# Patient Record
Sex: MALE | Race: WHITE | NOT HISPANIC OR LATINO | Employment: OTHER | URBAN - METROPOLITAN AREA
[De-identification: names, ages, dates, MRNs, and addresses within clinical notes are randomized per-mention and may not be internally consistent; named-entity substitution may affect disease eponyms.]

---

## 2018-03-19 PROBLEM — I20.9 ANGINA, CLASS IV: Status: ACTIVE | Noted: 2018-03-19

## 2018-08-06 ENCOUNTER — HOSPITAL ENCOUNTER (EMERGENCY)
Facility: HOSPITAL | Age: 63
Discharge: HOME | End: 2018-08-06
Payer: MEDICARE

## 2018-08-06 VITALS
WEIGHT: 315 LBS | BODY MASS INDEX: 46.65 KG/M2 | DIASTOLIC BLOOD PRESSURE: 75 MMHG | HEIGHT: 69 IN | OXYGEN SATURATION: 98 % | HEART RATE: 71 BPM | SYSTOLIC BLOOD PRESSURE: 161 MMHG | TEMPERATURE: 99 F | RESPIRATION RATE: 16 BRPM

## 2018-08-06 DIAGNOSIS — Z04.1 EXAM FOLLOWING MVC (MOTOR VEHICLE COLLISION), NO APPARENT INJURY: Primary | ICD-10-CM

## 2018-08-06 PROCEDURE — 25000003 PHARM REV CODE 250: Performed by: INTERNAL MEDICINE

## 2018-08-06 PROCEDURE — 99283 EMERGENCY DEPT VISIT LOW MDM: CPT

## 2018-08-06 RX ORDER — ACETAMINOPHEN 500 MG
1000 TABLET ORAL
Status: COMPLETED | OUTPATIENT
Start: 2018-08-06 | End: 2018-08-06

## 2018-08-06 RX ADMIN — ACETAMINOPHEN 1000 MG: 500 TABLET ORAL at 07:08
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
## 2019-09-16 ENCOUNTER — HOSPITAL ENCOUNTER (OUTPATIENT)
Dept: SLEEP MEDICINE | Facility: HOSPITAL | Age: 64
Discharge: HOME | End: 2019-09-16
Attending: NURSE PRACTITIONER
Payer: MEDICARE

## 2019-09-16 DIAGNOSIS — E66.2 PICKWICKIAN SYNDROME: ICD-10-CM

## 2019-09-16 PROCEDURE — 95810 POLYSOM 6/> YRS 4/> PARAM: CPT

## 2019-10-05 ENCOUNTER — HOSPITAL ENCOUNTER (OUTPATIENT)
Dept: SLEEP MEDICINE | Facility: HOSPITAL | Age: 64
Discharge: HOME | End: 2019-10-05
Attending: NURSE PRACTITIONER
Payer: MEDICARE

## 2019-10-05 DIAGNOSIS — G47.00 INSOMNIA WITH SLEEP APNEA: ICD-10-CM

## 2019-10-05 DIAGNOSIS — G47.30 INSOMNIA WITH SLEEP APNEA: ICD-10-CM

## 2019-10-05 DIAGNOSIS — E66.2 PICKWICKIAN SYNDROME: ICD-10-CM

## 2019-10-05 PROCEDURE — 95811 POLYSOM 6/>YRS CPAP 4/> PARM: CPT

## 2019-12-21 PROBLEM — I95.9 HYPOTENSIVE EPISODE: Status: ACTIVE | Noted: 2019-12-21

## 2019-12-21 PROBLEM — I48.0 PAF (PAROXYSMAL ATRIAL FIBRILLATION): Status: ACTIVE | Noted: 2019-12-21

## 2019-12-21 PROBLEM — G45.9 TIA (TRANSIENT ISCHEMIC ATTACK): Status: ACTIVE | Noted: 2019-12-21

## 2019-12-22 PROBLEM — I95.9 HYPOTENSIVE EPISODE: Status: RESOLVED | Noted: 2019-12-21 | Resolved: 2019-12-22

## 2020-01-15 PROBLEM — L02.91 ABSCESS: Status: ACTIVE | Noted: 2020-01-15

## 2020-01-15 PROBLEM — R79.89 ELEVATED TROPONIN: Status: ACTIVE | Noted: 2020-01-15

## 2020-01-15 PROBLEM — I49.5 SSS (SICK SINUS SYNDROME): Status: ACTIVE | Noted: 2020-01-15

## 2020-01-16 PROBLEM — I49.5 TACHY-BRADY SYNDROME: Status: ACTIVE | Noted: 2020-01-16

## 2020-05-18 PROBLEM — I49.3 PVC (PREMATURE VENTRICULAR CONTRACTION): Status: ACTIVE | Noted: 2020-05-18

## 2021-01-13 PROBLEM — I48.21 PERMANENT ATRIAL FIBRILLATION WITH RVR: Status: ACTIVE | Noted: 2021-01-13

## 2021-05-04 ENCOUNTER — PATIENT MESSAGE (OUTPATIENT)
Dept: RESEARCH | Facility: HOSPITAL | Age: 66
End: 2021-05-04

## 2022-01-01 ENCOUNTER — HOSPITAL ENCOUNTER (EMERGENCY)
Facility: HOSPITAL | Age: 67
Discharge: HOME | End: 2022-01-01
Attending: SURGERY
Payer: MEDICARE

## 2022-01-01 VITALS
HEART RATE: 72 BPM | SYSTOLIC BLOOD PRESSURE: 136 MMHG | DIASTOLIC BLOOD PRESSURE: 76 MMHG | TEMPERATURE: 98 F | RESPIRATION RATE: 20 BRPM | WEIGHT: 315 LBS | BODY MASS INDEX: 61.28 KG/M2 | OXYGEN SATURATION: 99 %

## 2022-01-01 DIAGNOSIS — J40 BRONCHITIS: ICD-10-CM

## 2022-01-01 DIAGNOSIS — Z20.822 ENCOUNTER FOR LABORATORY TESTING FOR COVID-19 VIRUS: Primary | ICD-10-CM

## 2022-01-01 LAB
INFLUENZA A, MOLECULAR: NEGATIVE
INFLUENZA B, MOLECULAR: NEGATIVE
SARS-COV-2 RDRP RESP QL NAA+PROBE: NEGATIVE
SPECIMEN SOURCE: NORMAL

## 2022-01-01 PROCEDURE — 99284 EMERGENCY DEPT VISIT MOD MDM: CPT | Mod: 25

## 2022-01-01 PROCEDURE — 87502 INFLUENZA DNA AMP PROBE: CPT | Performed by: SURGERY

## 2022-01-01 PROCEDURE — U0002 COVID-19 LAB TEST NON-CDC: HCPCS | Performed by: SURGERY

## 2022-01-01 RX ORDER — BENZONATATE 100 MG/1
200 CAPSULE ORAL 3 TIMES DAILY PRN
Qty: 20 CAPSULE | Refills: 0 | Status: SHIPPED | OUTPATIENT
Start: 2022-01-01 | End: 2022-01-11

## 2022-01-01 RX ORDER — DOXYCYCLINE 100 MG/1
100 CAPSULE ORAL 2 TIMES DAILY
Qty: 20 CAPSULE | Refills: 0 | Status: SHIPPED | OUTPATIENT
Start: 2022-01-01 | End: 2022-01-11

## 2022-01-01 NOTE — ED PROVIDER NOTES
[facility]                                                 I reviewed the ER triage nurse's note before evaluating the patient    Chief Complaint  66 y.o. male with COVID-19 Concerns (C/o runny nose, cough, and SOB today. )    History of Present Illness  Ruben Hathaway Sr. presents to the emergency room with nasal congestion today  Patient with clear nasal drainage nasal mucosa erythema on ER evaluation today  Patient with clear lung sounds with no wheezing or sputum identified in the ER today  Patient with no obvious signs of sputum production, no nausea vomiting or diarrhea  Patient is not toxic or dehydrated, 99% oxygenation, wants COVID test in the ER    The history is provided by the patient  Previous medical records were obtained from [facility]  Previous records are summarized from prior ER visits and hospitalizations  Medical HX: AFib, cellulitis, CHF, COPD, CAD, arrhythmia, HTN, SOB, VIRY  Surgical history significant for ablation, angiogram, cardiac stents, pacemaker, carpal tunnel   Allergic to sulfa  No significant family history  No significant social history, nonsmoker    I have reviewed all of this patient's past medical, surgical, family, and social   histories as well as active allergies and medications documented in the  electronic medical record    Review of Systems and Physical Exam      Review of Systems (all other ROS are otherwise negative)  -- Constitution - subjective fever, denies fatigue, no weakness, no chills  -- Eyes - no tearing or redness, no visual disturbance  -- Ear, Nose - sneezing, nasal congestion and clear discharge   -- Mouth,Throat - sore throat, no toothache, normal voice, normal swallowing  -- Respiratory - cough and congestion, no shortness of breath, no sputum  -- Cardiovascular - denies chest pain, no palpitations, denies claudication  -- Gastrointestinal - denies abdominal pain, nausea, vomiting, or diarrhea  -- Genitourinary - no dysuria, no hematuria,  no flank pain, no bladder pain  -- Musculoskeletal - denies back pain, negative for trauma or injury  -- Neurological - no headache, denies weakness or seizure; no LOC  -- Skin - denies pallor, rash, or changes in skin. no hives or welts noted     Physical Exam  -- Nursing note and vitals reviewed  -- Constitutional: Appears well-developed and well-nourished  -- Head: Atraumatic. Normocephalic. No obvious abnormality  -- Eyes: Pupils are equal and reactive to light. Normal conjunctiva and lids  -- Nose: nasal mucosa erythema and edema; clear nasal discharge noted   -- Throat: post-nasal drip with mild posterior oropharnyx erythema  -- Ears: External ears and TM normal bilaterally. Normal hearing and no drainage  -- Neck: Normal range of motion. Neck supple. No masses, trachea midline  -- Cardiac: Normal rate, regular rhythm and normal heart sounds  -- Respiratory: faint rhonchi at the bilateral bases with no active wheezing   -- Gastrointestinal: Soft, no tenderness. Normal bowel sounds. Normal liver edge  -- Musculoskeletal: Normal range of motion, no effusions. Joints stable   -- Neurological: No focal deficits. Showed good interaction with staff  -- Vascular: Posterior tibial, dorsalis pedis and radial pulses 2+ bilaterally       Emergency Room Course      Treatment and Evaluation  -- rapid Coronavirus PCR was negative  -- the patient tested negative for influenza  -- Chest x-ray showed no infiltrate and showed no acute pathology  -- IM 1 g Rocephin given today in the ER    Assessment, Disposition, & Plan      Diagnosis  [Z20.822] Encounter for laboratory testing for COVID-19 virus (Primary)  [J40] Bronchitis    Disposition and Plan  -- Disposition: home  -- Condition: stable  -- Follow-up: Patient to follow up with Asa Nobles MD in 1-2 days.  -- I advised the patient that we have found no life threatening condition today  -- At this time, I believe the patient is clinically stable for discharge.   -- Pt  understands that the visit today is to address immediate concerns   -- Further workup and evaluation as an outpatient may be required  -- The patient acknowledges that close follow up with a MD is required   -- Patient agrees to comply with all instruction and direction given in the ER    This note is dictated on M*Modal word recognition program.  There are word recognition mistakes that are occasionally missed on review.          Juan Mancuso MD  01/01/22 [id number]

## 2022-01-27 PROBLEM — S83.282A TEAR OF LATERAL MENISCUS OF LEFT KNEE: Status: ACTIVE | Noted: 2022-01-27

## 2022-01-27 PROBLEM — S83.207A TEAR OF LEFT MENISCUS AS CURRENT INJURY: Status: ACTIVE | Noted: 2022-01-27

## 2022-03-28 PROBLEM — K92.1 GASTROINTESTINAL HEMORRHAGE WITH MELENA: Status: ACTIVE | Noted: 2022-03-28

## 2022-03-29 PROBLEM — K25.0 ACUTE GASTRIC ULCER WITH BLEEDING: Status: ACTIVE | Noted: 2022-03-29

## 2022-03-30 PROBLEM — Z86.79 HISTORY OF HYPERTENSION: Status: ACTIVE | Noted: 2022-03-30

## 2022-03-30 PROBLEM — Z86.39 HISTORY OF HYPERLIPIDEMIA: Status: ACTIVE | Noted: 2022-03-30

## 2022-03-30 PROBLEM — D62 ACUTE BLOOD LOSS ANEMIA: Status: ACTIVE | Noted: 2022-03-30

## 2022-04-02 PROBLEM — K25.0 ACUTE GASTRIC ULCER WITH BLEEDING: Status: RESOLVED | Noted: 2022-03-29 | Resolved: 2022-04-02

## 2022-04-02 PROBLEM — K92.1 GASTROINTESTINAL HEMORRHAGE WITH MELENA: Status: RESOLVED | Noted: 2022-03-28 | Resolved: 2022-04-02

## 2022-04-02 PROBLEM — D62 ACUTE BLOOD LOSS ANEMIA: Status: RESOLVED | Noted: 2022-03-30 | Resolved: 2022-04-02

## 2023-09-21 PROBLEM — I50.32 CHRONIC DIASTOLIC HEART FAILURE, NYHA CLASS 2: Status: ACTIVE | Noted: 2023-09-21

## 2023-09-21 PROBLEM — R94.39 ABNORMAL MYOCARDIAL PERFUSION STUDY: Status: ACTIVE | Noted: 2023-09-21

## 2023-09-21 PROBLEM — I25.10 CAD (CORONARY ARTERY DISEASE): Status: ACTIVE | Noted: 2023-09-21

## 2025-05-23 ENCOUNTER — HOSPITAL ENCOUNTER (EMERGENCY)
Facility: HOSPITAL | Age: 70
Discharge: HOME | End: 2025-05-23
Payer: MEDICARE

## 2025-05-23 VITALS
OXYGEN SATURATION: 97 % | DIASTOLIC BLOOD PRESSURE: 85 MMHG | HEIGHT: 69 IN | RESPIRATION RATE: 20 BRPM | TEMPERATURE: 99 F | BODY MASS INDEX: 46.65 KG/M2 | HEART RATE: 73 BPM | WEIGHT: 315 LBS | SYSTOLIC BLOOD PRESSURE: 169 MMHG

## 2025-05-23 DIAGNOSIS — L30.9 DERMATITIS: Primary | ICD-10-CM

## 2025-05-23 PROCEDURE — 63600175 PHARM REV CODE 636 W HCPCS: Mod: JZ,TB

## 2025-05-23 PROCEDURE — 99284 EMERGENCY DEPT VISIT MOD MDM: CPT | Mod: 25

## 2025-05-23 PROCEDURE — 96372 THER/PROPH/DIAG INJ SC/IM: CPT

## 2025-05-23 RX ORDER — METHYLPREDNISOLONE SOD SUCC 125 MG
125 VIAL (EA) INJECTION
Status: COMPLETED | OUTPATIENT
Start: 2025-05-23 | End: 2025-05-23

## 2025-05-23 RX ORDER — CETIRIZINE HYDROCHLORIDE 10 MG/1
10 TABLET ORAL DAILY
Qty: 30 TABLET | Refills: 0 | Status: SHIPPED | OUTPATIENT
Start: 2025-05-23 | End: 2025-06-22

## 2025-05-23 RX ORDER — PREDNISONE 20 MG/1
40 TABLET ORAL DAILY
Qty: 10 TABLET | Refills: 0 | Status: SHIPPED | OUTPATIENT
Start: 2025-05-24 | End: 2025-05-29

## 2025-05-23 RX ORDER — DIPHENHYDRAMINE HYDROCHLORIDE 50 MG/ML
25 INJECTION, SOLUTION INTRAMUSCULAR; INTRAVENOUS
Status: DISCONTINUED | OUTPATIENT
Start: 2025-05-23 | End: 2025-05-23 | Stop reason: HOSPADM

## 2025-05-23 RX ADMIN — METHYLPREDNISOLONE SODIUM SUCCINATE 125 MG: 125 INJECTION, POWDER, FOR SOLUTION INTRAMUSCULAR; INTRAVENOUS at 04:05

## 2025-05-23 NOTE — ED PROVIDER NOTES
Encounter Date: 5/23/2025       History     Chief Complaint   Patient presents with    Rash     This note is dictated on M*Modal word recognition program.  There are word recognition mistakes and grammatical errors that are occasionally missed on review.     Ruben Hathaway Sr. is a 69 y.o. male with a history of abnormal stress test, asthma, AFib, cellulitis, CHF, COPD, hypertension, poor mobility presents to ER today with complaints of itchy rash to bilateral arms, under left eye and abdomen for the last 72 hours.  Patient reports the rash is very pruritic in nature denies that the rash is painful.  He denies any new detergents or body washes that he is aware of denies new medications unsure if you may have ate something that caused a reaction 3 days ago.    The history is provided by the patient.     Review of patient's allergies indicates:   Allergen Reactions    Sulfa (sulfonamide antibiotics) Other (See Comments)     Physically violent     Past Medical History:   Diagnosis Date    Abnormal stress test     Anemia, unspecified     Anticoagulant long-term use     Arthritis     Asthma     Atrial fibrillation     Bleeding gastric ulcer     Carotid artery disease     Cellulitis     leg, resolved    CHF (congestive heart failure)     COPD (chronic obstructive pulmonary disease)     Coronary artery disease     General anesthetics causing adverse effect in therapeutic use     states he is difficult to sedate    Heart rate fast     History of asbestosis     History of heart attack     Hypertension     Mobility poor     uses cane    Shortness of breath     Sleep apnea     uses cpap    Swelling     in both legs     Past Surgical History:   Procedure Laterality Date    ABLATION      cardiac 2    ABLATION N/A 05/18/2020    Procedure: Ablation;  Surgeon: George Jordan MD;  Location: [facility];  Service: Cardiology;  Laterality: N/A;    ABLATION N/A 01/13/2021    Procedure: Ablation;  Surgeon: George Jordan MD;   Location: [facility] CATH;  Service: Cardiology;  Laterality: N/A;    ANGIOGRAM, CORONARY, WITH LEFT HEART CATHETERIZATION      with stents x10    ARTHROSCOPIC CHONDROPLASTY OF KNEE JOINT Left 01/27/2022    Procedure: ARTHROSCOPY, KNEE, WITH CHONDROPLASTY;  Surgeon: Charlie Meza MD;  [facility];  Service: Orthopedics;  Laterality: Left;    CARDIAC SURGERY      coronary stents x10    CARPAL TUNNEL RELEASE Right     CATARACT EXTRACTION W/ INTRAOCULAR LENS  IMPLANT, BILATERAL      ESOPHAGOGASTRODUODENOSCOPY N/A 03/28/2022    Procedure: EGD (ESOPHAGOGASTRODUODENOSCOPY);  Surgeon: Alpesh Cowart MD;  Location: [facility] ENDO;  Service: Endoscopy;  Laterality: N/A;    ESOPHAGOGASTRODUODENOSCOPY N/A 06/20/2022    Procedure: EGD (ESOPHAGOGASTRODUODENOSCOPY);  Surgeon: Alpesh Cowart MD;  Location: [facility] ENDO;  Service: Endoscopy;  Laterality: N/A;    INSERTION OF PACEMAKER N/A 01/13/2021    Procedure: INSERTION, PACEMAKER;  Surgeon: George Jordan MD;  Location: [facility] CATH;  Service: Cardiology;  Laterality: N/A;  ops # 2    KNEE ARTHROSCOPY W/ MENISCECTOMY Left 01/27/2022    Procedure: ARTHROSCOPY, KNEE, WITH MENISCECTOMY;  Surgeon: Charlie Meza MD;  [facility];  Service: Orthopedics;  Laterality: Left;    LEFT HEART CATHETERIZATION Left 9/21/2023    Procedure: Left heart cath;  Surgeon: Juan Pablo Payne MD;  Location: [facility] CATH;  Service: Cardiology;  Laterality: Left;    SYNOVECTOMY OF KNEE Left 01/27/2022    Procedure: SYNOVECTOMY, KNEE;  Surgeon: Charlie Meza MD;  [facility];  Service: Orthopedics;  Laterality: Left;  arthroscopic     Family History   Problem Relation Name Age of Onset    Hyperlipidemia Mother      Hypertension Mother      Heart disease Father      Hypertension Father      Progressive Supranuclear Palsy Father       Social History[1]  Review of Systems   Skin:  Positive for rash.   All other systems reviewed and are negative.      Physical Exam     Initial Vitals [05/23/25  1630]   BP Pulse Resp Temp SpO2   (!) 189/84 76 18 97.8 °F (36.6 °C) 97 %      MAP       --         Physical Exam    Constitutional: He appears well-developed and well-nourished.   Eyes: Right eye exhibits no discharge.   Neck: Neck supple.   Normal range of motion.  Cardiovascular:  Normal rate.           Pulmonary/Chest: No respiratory distress.   Abdominal: He exhibits no distension. There is no abdominal tenderness.   Musculoskeletal:         General: No tenderness or edema.      Cervical back: Normal range of motion and neck supple.      Comments: Patient uses walker at baseline.  Patient has at baseline musculoskeletal function     Skin: Capillary refill takes less than 2 seconds. Rash noted. No abscess noted. There is erythema.   Patient has rash to bilateral forearms under left eye and on abdomen consistent with possible contact dermatitis versus allergic reaction there was no abscess to rash   Psychiatric: He has a normal mood and affect. Thought content normal.         ED Course   Procedures  Labs Reviewed - No data to display       Imaging Results    None          Medications   methylPREDNISolone sodium succinate injection 125 mg (has no administration in time range)   diphenhydrAMINE injection 25 mg (has no administration in time range)     Medical Decision Making  Differential diagnosis includes contact dermatitis, allergic reaction, poison ivy contact    Patient's rash is consistent with contact dermatitis  Unlikely rash is shingles due to location of rash.  Rash does not follow single dermatome  Will treat patient's rash with corticosteroids and antihistamines to help with pruritus and erythema.  Vital signs stable at discharge.  Patient stable at time of discharge in no acute distress.  No life-threatening illnesses were found during ER visit today.  Patient was instructed to follow-up with PCP or other recommended specialist within the next 48-72 hours.  Patient was instructed to return to ER  immediately for any worsening or concerning symptoms.  All discharge instructions discussed with patient, and patient agrees to comply with discharge instructions given today.     Risk  OTC drugs.  Prescription drug management.                                      Clinical Impression:  Final diagnoses:  [L30.9] Dermatitis (Primary)          ED Disposition Condition    Discharge Stable          ED Prescriptions       Medication Sig Dispense Start Date End Date Auth. Provider    predniSONE (DELTASONE) 20 MG tablet Take 2 tablets (40 mg total) by mouth once daily. for 5 days 10 tablet 5/24/2025 5/29/2025 Juan Andrea, JO    cetirizine (ZYRTEC) 10 MG tablet Take 1 tablet (10 mg total) by mouth once daily. 30 tablet 5/23/2025 6/22/2025 Juan Andrea NP          Follow-up Information    None                [1]   Social History  Tobacco Use    Smoking status: Former     Current packs/day: 0.00     Types: Cigarettes     Quit date: 2015     Years since quitting: 10.3    Smokeless tobacco: Never   Substance Use Topics    Alcohol use: Never    Drug use: Never        Juan Andrea NP  05/23/25 4771